# Patient Record
(demographics unavailable — no encounter records)

---

## 2024-11-25 NOTE — ASSESSMENT
[FreeTextEntry1] : Patient has a left-sided distal radius fracture.  There is a styloid fracture.  The plan will be cast removal.  Going to removable brace.  On an as-needed basis.  She has some hip swelling for which she may see the hip service for.

## 2024-11-25 NOTE — PHYSICAL EXAM
[de-identified] : Patient has good Range of motion to the wrist and hand when the cast is removed.  Mild swelling is present.  No erythema ecchymoses or abrasions.  No masses.

## 2024-11-25 NOTE — HISTORY OF PRESENT ILLNESS
[de-identified] : 83-year-old female left side distal radius fracture.  Comes in today for evaluation.  Has been comfortable at home in her cast.  No complaints.

## 2024-11-25 NOTE — DATA REVIEWED
[FreeTextEntry1] : Radiographs 3 views of the left wrist are reviewed showing good position alignment and healing of the left-sided distal radius fracture intra-articular radial styloid

## 2024-11-26 NOTE — HISTORY OF PRESENT ILLNESS
[de-identified] : Patient is a 82 y/o  female here for evaluation of left hip.  Patient states about 2 weeks ago she fell onto her left hip.  Patient states that she was not in immediate pain to her left hip but over the next few days she noticed swelling.  Patient states she is still in discomfort, notices swelling to the lateral aspect of her hip.  Denies instability, numbness tingling.  Patient states pain comes and goes worsens after activities.  Left hip exam: Mild to moderate lateral hip effusion with no loose free fluid, no erythema no ecchymosis no sign of infection.  Tenderness palpation over greater trochanter, lateral hip, good range of motion good strength no gross instability neurovascular intact  X-ray bilateral hips for comparison: No acute fractures, subluxations, dislocations.  Moderate osteoarthritis bilateral hips  Discussed with patient detail that she has left hip contusion, hip contusions can sometimes take 4 to 6 weeks to fully heal.  Patient also has an effusion which at this point is too late for aspiration.  Advised warm compresses, rest, do not sleep on left side, range of motion exercises, elevation, follow-up in 6 weeks for reevaluation.  Patient will call if symptoms worsen or change.  Motrin/Tylenol as needed for pain.

## 2025-01-03 NOTE — HISTORY OF PRESENT ILLNESS
[de-identified] : Patient is a 85 y/o  female here for reevaluation of left hip.  Patient states that overall she is doing much better, no current complaints  Left hip exam: Minimal lateral hip effusion with no loose free fluid, no erythema no ecchymosis no sign of infection.  Minimal tenderness palpation over greater trochanter, lateral hip, good range of motion good strength no gross instability neurovascular intact  Reviewed prior x-ray bilateral hips for comparison: No acute fractures, subluxations, dislocations.  Moderate osteoarthritis bilateral hips  Patient is overall healing well from her left hip contusion, discussed with patient that it may take another month or so for 100% of the swelling to go down we will continue warm compresses range of motion exercises otherwise patient is doing well and will follow-up as needed will call with any questions or concerns